# Patient Record
Sex: MALE | Race: WHITE | NOT HISPANIC OR LATINO | URBAN - METROPOLITAN AREA
[De-identification: names, ages, dates, MRNs, and addresses within clinical notes are randomized per-mention and may not be internally consistent; named-entity substitution may affect disease eponyms.]

---

## 2020-03-23 ENCOUNTER — EMERGENCY (EMERGENCY)
Facility: HOSPITAL | Age: 55
LOS: 0 days | Discharge: HOME | End: 2020-03-23
Attending: EMERGENCY MEDICINE | Admitting: INTERNAL MEDICINE
Payer: SUBSIDIZED

## 2020-03-23 VITALS
HEART RATE: 68 BPM | OXYGEN SATURATION: 98 % | RESPIRATION RATE: 20 BRPM | SYSTOLIC BLOOD PRESSURE: 178 MMHG | TEMPERATURE: 99 F | WEIGHT: 191.14 LBS | DIASTOLIC BLOOD PRESSURE: 96 MMHG

## 2020-03-23 VITALS
OXYGEN SATURATION: 97 % | SYSTOLIC BLOOD PRESSURE: 146 MMHG | DIASTOLIC BLOOD PRESSURE: 83 MMHG | TEMPERATURE: 98 F | RESPIRATION RATE: 20 BRPM | HEART RATE: 78 BPM

## 2020-03-23 DIAGNOSIS — F17.210 NICOTINE DEPENDENCE, CIGARETTES, UNCOMPLICATED: ICD-10-CM

## 2020-03-23 DIAGNOSIS — R47.1 DYSARTHRIA AND ANARTHRIA: ICD-10-CM

## 2020-03-23 DIAGNOSIS — I63.9 CEREBRAL INFARCTION, UNSPECIFIED: ICD-10-CM

## 2020-03-23 LAB
ALBUMIN SERPL ELPH-MCNC: 4.7 G/DL — SIGNIFICANT CHANGE UP (ref 3.5–5.2)
ALP SERPL-CCNC: 120 U/L — HIGH (ref 30–115)
ALT FLD-CCNC: 20 U/L — SIGNIFICANT CHANGE UP (ref 0–41)
ANION GAP SERPL CALC-SCNC: 13 MMOL/L — SIGNIFICANT CHANGE UP (ref 7–14)
APTT BLD: 25.3 SEC — LOW (ref 27–39.2)
AST SERPL-CCNC: 26 U/L — SIGNIFICANT CHANGE UP (ref 0–41)
BASOPHILS # BLD AUTO: 0.05 K/UL — SIGNIFICANT CHANGE UP (ref 0–0.2)
BASOPHILS NFR BLD AUTO: 0.5 % — SIGNIFICANT CHANGE UP (ref 0–1)
BILIRUB SERPL-MCNC: <0.2 MG/DL — SIGNIFICANT CHANGE UP (ref 0.2–1.2)
BUN SERPL-MCNC: 11 MG/DL — SIGNIFICANT CHANGE UP (ref 10–20)
CALCIUM SERPL-MCNC: 9.6 MG/DL — SIGNIFICANT CHANGE UP (ref 8.5–10.1)
CHLORIDE SERPL-SCNC: 103 MMOL/L — SIGNIFICANT CHANGE UP (ref 98–110)
CO2 SERPL-SCNC: 23 MMOL/L — SIGNIFICANT CHANGE UP (ref 17–32)
CREAT SERPL-MCNC: 1.2 MG/DL — SIGNIFICANT CHANGE UP (ref 0.7–1.5)
EOSINOPHIL # BLD AUTO: 0.21 K/UL — SIGNIFICANT CHANGE UP (ref 0–0.7)
EOSINOPHIL NFR BLD AUTO: 2.2 % — SIGNIFICANT CHANGE UP (ref 0–8)
GLUCOSE SERPL-MCNC: 92 MG/DL — SIGNIFICANT CHANGE UP (ref 70–99)
HCT VFR BLD CALC: 42.4 % — SIGNIFICANT CHANGE UP (ref 42–52)
HGB BLD-MCNC: 14.3 G/DL — SIGNIFICANT CHANGE UP (ref 14–18)
IMM GRANULOCYTES NFR BLD AUTO: 0.4 % — HIGH (ref 0.1–0.3)
INR BLD: 0.97 RATIO — SIGNIFICANT CHANGE UP (ref 0.65–1.3)
LYMPHOCYTES # BLD AUTO: 3 K/UL — SIGNIFICANT CHANGE UP (ref 1.2–3.4)
LYMPHOCYTES # BLD AUTO: 31 % — SIGNIFICANT CHANGE UP (ref 20.5–51.1)
MCHC RBC-ENTMCNC: 31 PG — SIGNIFICANT CHANGE UP (ref 27–31)
MCHC RBC-ENTMCNC: 33.7 G/DL — SIGNIFICANT CHANGE UP (ref 32–37)
MCV RBC AUTO: 92 FL — SIGNIFICANT CHANGE UP (ref 80–94)
MONOCYTES # BLD AUTO: 0.53 K/UL — SIGNIFICANT CHANGE UP (ref 0.1–0.6)
MONOCYTES NFR BLD AUTO: 5.5 % — SIGNIFICANT CHANGE UP (ref 1.7–9.3)
NEUTROPHILS # BLD AUTO: 5.84 K/UL — SIGNIFICANT CHANGE UP (ref 1.4–6.5)
NEUTROPHILS NFR BLD AUTO: 60.4 % — SIGNIFICANT CHANGE UP (ref 42.2–75.2)
NRBC # BLD: 0 /100 WBCS — SIGNIFICANT CHANGE UP (ref 0–0)
PLATELET # BLD AUTO: 259 K/UL — SIGNIFICANT CHANGE UP (ref 130–400)
POTASSIUM SERPL-MCNC: 4.9 MMOL/L — SIGNIFICANT CHANGE UP (ref 3.5–5)
POTASSIUM SERPL-SCNC: 4.9 MMOL/L — SIGNIFICANT CHANGE UP (ref 3.5–5)
PROT SERPL-MCNC: 8.3 G/DL — HIGH (ref 6–8)
PROTHROM AB SERPL-ACNC: 11.2 SEC — SIGNIFICANT CHANGE UP (ref 9.95–12.87)
RBC # BLD: 4.61 M/UL — LOW (ref 4.7–6.1)
RBC # FLD: 12.4 % — SIGNIFICANT CHANGE UP (ref 11.5–14.5)
SODIUM SERPL-SCNC: 139 MMOL/L — SIGNIFICANT CHANGE UP (ref 135–146)
WBC # BLD: 9.67 K/UL — SIGNIFICANT CHANGE UP (ref 4.8–10.8)
WBC # FLD AUTO: 9.67 K/UL — SIGNIFICANT CHANGE UP (ref 4.8–10.8)

## 2020-03-23 PROCEDURE — 70496 CT ANGIOGRAPHY HEAD: CPT | Mod: 26

## 2020-03-23 PROCEDURE — 70498 CT ANGIOGRAPHY NECK: CPT | Mod: 26

## 2020-03-23 PROCEDURE — 99204 OFFICE O/P NEW MOD 45 MIN: CPT | Mod: GC

## 2020-03-23 PROCEDURE — 99285 EMERGENCY DEPT VISIT HI MDM: CPT

## 2020-03-23 PROCEDURE — 70450 CT HEAD/BRAIN W/O DYE: CPT | Mod: 26,59

## 2020-03-23 PROCEDURE — 93010 ELECTROCARDIOGRAM REPORT: CPT

## 2020-03-23 RX ORDER — ATORVASTATIN CALCIUM 80 MG/1
80 TABLET, FILM COATED ORAL ONCE
Refills: 0 | Status: COMPLETED | OUTPATIENT
Start: 2020-03-23 | End: 2020-03-23

## 2020-03-23 RX ORDER — ASPIRIN/CALCIUM CARB/MAGNESIUM 324 MG
81 TABLET ORAL DAILY
Refills: 0 | Status: DISCONTINUED | OUTPATIENT
Start: 2020-03-23 | End: 2020-03-23

## 2020-03-23 RX ORDER — ENOXAPARIN SODIUM 100 MG/ML
40 INJECTION SUBCUTANEOUS DAILY
Refills: 0 | Status: DISCONTINUED | OUTPATIENT
Start: 2020-03-23 | End: 2020-03-23

## 2020-03-23 RX ORDER — ATORVASTATIN CALCIUM 80 MG/1
1 TABLET, FILM COATED ORAL
Qty: 30 | Refills: 0
Start: 2020-03-23 | End: 2020-04-21

## 2020-03-23 RX ORDER — ASPIRIN/CALCIUM CARB/MAGNESIUM 324 MG
1 TABLET ORAL
Qty: 30 | Refills: 0
Start: 2020-03-23 | End: 2020-04-21

## 2020-03-23 RX ORDER — ATORVASTATIN CALCIUM 80 MG/1
80 TABLET, FILM COATED ORAL AT BEDTIME
Refills: 0 | Status: DISCONTINUED | OUTPATIENT
Start: 2020-03-24 | End: 2020-03-23

## 2020-03-23 RX ORDER — CHLORHEXIDINE GLUCONATE 213 G/1000ML
1 SOLUTION TOPICAL
Refills: 0 | Status: DISCONTINUED | OUTPATIENT
Start: 2020-03-23 | End: 2020-03-23

## 2020-03-23 RX ADMIN — Medication 81 MILLIGRAM(S): at 15:54

## 2020-03-23 RX ADMIN — ATORVASTATIN CALCIUM 80 MILLIGRAM(S): 80 TABLET, FILM COATED ORAL at 16:00

## 2020-03-23 NOTE — H&P ADULT - NSHPPHYSICALEXAM_GEN_ALL_CORE
GENERAL: NAD, well-developed  HEENT:  Atraumatic, Normocephalic; EOMI, PERRLA, conjunctiva pink, sclera white, Supple, No JVD, thyromegally or LYAD appreciated  CHEST/LUNG: Clear to auscultation bilaterally; No wheeze, ronchi or rales  HEART: Regular rate and rhythm; No murmurs, rubs, or gallops  ABDOMEN: Soft, Nontender, Nondistended; Bowel sounds present  EXTREMITIES:  2+ Peripheral Pulses, No peripheral pitting edema  PSYCH: AAOx3  NEUROLOGY: non-focal  SKIN: No rashes/lesions appreciated PATIENT HAS CHOSEN TO LEAVE AMA. WENT TO EXAMINE AND ADMIT PT AND AT THAT TIME REFUSED BOTH SAYING 'I AM NOT STAYING I WANT TO GO HOME'.     I EXPLAINED AT LENGTH THAT WORKUP WAS NOT COMPLETE AND THAT HE DOESN'T HAVE A PRIMARY CARE DOCTOR TO SEE BUT HE WAS ADAMANT. HE IS AAOX3 AND ENDORSES UNDERSTANDING OF ALL RISKS INCLUDING DEATH. PT IS NOT DISCHARGED - WORKUP INCOMPLETE, DISCHARGE AGAINST MEDICAL ADVICE.     (Discussed risk of worsening of CVA, hemorrhagic conversion, risk of paralysis/vegetative state, and/or death if goes home).

## 2020-03-23 NOTE — DISCHARGE NOTE PROVIDER - NSDCFUADDINST_GEN_ALL_CORE_FT
YOU HAVE CHOSEN TO LEAVE AMA. MEDICAL PROVIDERS EXPLAINED AT LENGTH THAT WORKUP IS NOT COMPLETE.   You do not HAVE A PRIMARY CARE DOCTOR to follow up with   You verbally endorse UNDERSTANDING OF ALL RISKS INCLUDING DEATH. YOU ARE NOT DISCHARGED  as medical workup incomplete including futher blood work, echocardiogram, and MRI head. You had elevated blood pressure and need to be screened for underlying cardiac disease and diabetes.   SIGNING OUT AGAINST MEDICAL ADVICE.   Discussed risk of worsening of CVA, hemorrhagic conversion, risk of paralysis/vegetative state, and/or death if goes home.

## 2020-03-23 NOTE — DISCHARGE NOTE PROVIDER - NSFOLLOWUPCLINICS_GEN_ALL_ED_FT
[facility]  Neurology  [address]  [place] [address]  Phone: [phone]  Fax:   Follow Up Time:     [facility]  Medicine  [address] [place], [place]   Phone: [phone]  Fax:   Follow Up Time:

## 2020-03-23 NOTE — ED PROVIDER NOTE - CLINICAL SUMMARY MEDICAL DECISION MAKING FREE TEXT BOX
54 Y/O M + SMOKER C/O DYSARTHRIA UPON AWAKENING THIS AM. STRONE CODE ACTIVATED. NIHSS-3 AS PER NEURO EVAL. CT HEAD WITH NO ACUTE PATHOLOGY. ALL DIAGNOSTIC TESTING REVIEWED. PT ADMITTED TO STROKE UNIT.

## 2020-03-23 NOTE — DISCHARGE NOTE NURSING/CASE MANAGEMENT/SOCIAL WORK - NSDCPEPTSTRK_GEN_ALL_CORE
Risk factors for stroke/Stroke warning signs and symptoms/Signs and symptoms of stroke/Call 911 for stroke/Stroke education booklet/Prescribed medications/Stroke support groups for patients, families, and friends/Need for follow up after discharge

## 2020-03-23 NOTE — ED PROVIDER NOTE - PHYSICAL EXAMINATION
CONSTITUTIONAL: Well-developed; well-nourished; in no acute distress, speaking in full sentences  SKIN: warm, dry  HEAD: Normocephalic; atraumatic  EYES: PERRL, EOMI, no conjunctival erythema  ENT: No nasal discharge; airway clear, mucous membranes moist  NECK: Supple; non tender, FROM  CARD: +S1, S2 no murmurs, gallops, or rubs. Regular rate and rhythm. radial 2+  RESP: No wheezes, rales or rhonchi. CTABL  ABD: soft ntnd, no rebound, no guarding, no rigidity,   EXT: moves all extremities, ambulates wo assistance No clubbing, cyanosis or edema.   NEURO: Alert, oriented, +right sided facial droop with dysarthria noted, no pronator drift, no dysmetria, ue and le strength and sensation grossly intact  PSYCH: Cooperative, appropriate

## 2020-03-23 NOTE — DISCHARGE NOTE PROVIDER - CARE PROVIDER_API CALL
Anand De La Garza (DO)  Medicine  Physicians  [address] [place] [address]  Phone: [phone]  Fax: [phone]  Follow Up Time: 1 week

## 2020-03-23 NOTE — H&P ADULT - HISTORY OF PRESENT ILLNESS
54y/o M with active smoker, no known PMH but doesn't see Dr presents to hospital with dysarthria. Woke up 6AM with some difficulty with speech, thought it would improve so didn't seek medical attention. Last normal night before. By later in morning wife also noticed L facial droop so he presented to ED. Stroke code called at 2PM but pt was not candidate for tPA given timeframe.    In ED: T98.9, HR 78, RR20, SaO2 98% on ambient air, 146/83. Labs only remarkable for slight elevated in ALP. CTH and CTA done showed no acute pathology.   Seen by neuro with starting NIHSS 3 (dysathria, L lower face droop), MRANKIN 0. Received ASA 81 and atorvastatin 80. 56y/o M with active smoker, no known PMH but doesn't see Dr presents to hospital with dysarthria. Woke up 6AM with some difficulty with speech, thought it would improve so didn't seek medical attention. Last normal night before. By later in morning wife also noticed L facial droop so he presented to ED. Stroke code called at 2PM but pt was not candidate for tPA given timeframe.    PATIENT HAS CHOSEN TO LEAVE AMA. WENT TO EXAMINE AND ADMIT PT AND AT THAT TIME REFUSED BOTH SAYING 'I AM NOT STAYING I WANT TO GO HOME'.     I EXPLAINED AT LENGTH THAT WORKUP WAS NOT COMPLETE AND THAT HE DOESN'T HAVE A PRIMARY CARE DOCTOR TO SEE BUT HE WAS ADAMANT. HE IS AAOX3 AND ENDORSES UNDERSTANDING OF ALL RISKS INCLUDING DEATH. PT IS NOT DISCHARGED - WORKUP INCOMPLETE, DISCHARGE AGAINST MEDICAL ADVICE.     (Discussed risk of worsening of CVA, hemorrhagic conversion, risk of paralysis/vegetative state, and/or death if goes home).

## 2020-03-23 NOTE — ED PROVIDER NOTE - ATTENDING CONTRIBUTION TO CARE
I personally evaluated the patient. I reviewed the Resident’s or Physician Assistant’s note (as assigned above), and agree with the findings and plan except as documented in my note.   54 Y/O M 1/2 PPD SMOKER, C/O DYSARTHRIA UPON AWAKENING THIS AM. PT REPORTS WIFE ALSO NOTED FACIAL DROOP LATER IN THE MORNING. NO VISION CHANGES. NO PARESTHESIAS OR MOTOR WEAKNESS. NO ATAXIA, DIZZINESS. NO HEADACHE. NO CP, SOB, PALPITATIONS. NO RECENT ILLNESS, FEVER, CHILLS, COUGH. VITALS NOTED. . ALERT OX3 NAD. NCAT PERRL EOMI. CN 2-12 INTACT NORMAL SPEECH EXCEPT+ MILD L LOWER FACIAL DROOP. 5/5 MOTOR STRENGTH B/L UPPER AND B/L LOWER EXT. NO PRONATOR DRIFT. NORMAL FINGER TO NOSE B/L. NO SENSORY DEFICIT. RRR. LUNGS CLEAR B/L. ABD- SOFT NONTENDER. BACK NONTENDER. 2+ RADIAL AND PEDAL PULSES B/L.

## 2020-03-23 NOTE — ED ADULT NURSE REASSESSMENT NOTE - NS ED NURSE REASSESS COMMENT FT1
pt states he wants to leave AMA. despite education/encouragement by RN.  MAR x [phone] made aware. awaiting for resident to see pt at bedside.report given to stroke unit but pt states he does not want to go. charge nurse made aware.

## 2020-03-23 NOTE — H&P ADULT - ATTENDING COMMENTS
Went to see the patient and he is uncooperative and wants to go home AMA after being admitted to the hospital for sudden dysarthria and left facial droop. Pt wants to go home. He does not have PMD and wants to now f/u with one.    Dx: TIA vs Acute Stroke unfolding    Plan:  AMA d/c  ASA 81mg po qd  Atorvastatin 40mg po qd  Low Salt Diet  Low Carb Diet  Weight Loss long term   f/u with [facility] and with Neurology clinic for TTE and full stroke labs w/up   Hydrate well   Stay at home and avoid infection    Vital Signs Last 24 Hrs  T(C): 36.4 (23 Mar 2020 15:08), Max: 37.2 (23 Mar 2020 13:55)  T(F): 97.6 (23 Mar 2020 15:08), Max: 98.9 (23 Mar 2020 13:55)  HR: 78 (23 Mar 2020 15:08) (68 - 81)  BP: 146/83 (23 Mar 2020 15:08) (146/83 - 178/96)  RR: 20 (23 Mar 2020 15:08) (18 - 20)  SpO2: 97% (23 Mar 2020 15:08) (97% - 98%)    CV: NL S1, S2  Resp: CTA bilateral   GI; WNL  Ext: WNL  Dysathria / fascial droop (initial now resolved)                          14.3   9.67  )-----------( 259      ( 23 Mar 2020 14:15 )             42.4       03-23    139  |  103  |  11  ----------------------------<  92  4.9   |  23  |  1.2    Ca    9.6      23 Mar 2020 14:15    TPro  8.3<H>  /  Alb  4.7  /  TBili  <0.2  /  DBili  x   /  AST  26  /  ALT  20  /  AlkPhos  120<H>  03-23      CTA  IMPRESSION:   No evidence of major vascular stenosis or occlusion.    CTH  IMPRESSION:   No CT evidence of acute intracranial hemorrhage or large territory infarct.    Otherwise agree with above resident documentation

## 2020-03-23 NOTE — H&P ADULT - ASSESSMENT
56y/o M who smokes but no known other medical hx, presents with persistent slurring speech & face droop.     #Dysarthria/face droop; likely CVA  - initial NIH 3  - CTH neg, CT perfusion neg  - stroke unit for now, neuro check q4h  - check lipid profile, A1c  - hypercoagulable labs  - MRI brain w/o MICHELLE  - ECHO with bubble  - S&S and OT eval   - keep -180  - Neuro recs appreciated  - repeat CT if any change in NIH/symptoms     #Active smoker- strongly urged cessation, c/w education  #Poor HCM- will need [facility] appt once d/c    #MISC  - DVT ppx: lovenox  - GI ppx: not indicated  - Activity: as tolerated  - Diet: NPO until S&S eval  - Dispo: stroke unit 54y/o M who smokes but no known other medical hx, presents with persistent slurring speech & face droop.     PATIENT HAS CHOSEN TO LEAVE AMA. WENT TO EXAMINE AND ADMIT PT AND AT THAT TIME REFUSED BOTH SAYING 'I AM NOT STAYING I WANT TO GO HOME'.     I EXPLAINED AT LENGTH THAT WORKUP WAS NOT COMPLETE AND THAT HE DOESN'T HAVE A PRIMARY CARE DOCTOR TO SEE BUT HE WAS ADAMANT. HE IS AAOX3 AND ENDORSES UNDERSTANDING OF ALL RISKS INCLUDING DEATH. PT IS NOT DISCHARGED - WORKUP INCOMPLETE, DISCHARGE AGAINST MEDICAL ADVICE.     (Discussed risk of worsening of CVA, hemorrhagic conversion, risk of paralysis/vegetative state, and/or death if goes home).

## 2020-03-23 NOTE — DISCHARGE NOTE NURSING/CASE MANAGEMENT/SOCIAL WORK - PATIENT PORTAL LINK FT
You can access the FollowMyHealth Patient Portal offered by [facility] by registering at the following website: [email or web address]. By joining [facility]’s FollowMyHealth portal, you will also be able to view your health information using other applications (apps) compatible with our system.

## 2020-03-23 NOTE — DISCHARGE NOTE PROVIDER - HOSPITAL COURSE
54y/o M with active smoker, no known PMH but doesn't see Dr presents to hospital with dysarthria. Woke up 6AM with some difficulty with speech, thought it would improve so didn't seek medical attention. Last normal night before. By later in morning wife also noticed L facial droop so he presented to ED. Stroke code called at 2PM but pt was not candidate for tPA given timeframe.        In ED: T98.9, HR 78, RR20, SaO2 98% on ambient air, 146/83. Labs only remarkable for slight elevated in ALP. CTH and CTA done showed no acute pathology.     Seen by neuro with starting NIHSS 3 (dysathria, L lower face droop), MRANKIN 0. Received ASA 81 and atorvastatin 80.         PATIENT HAS CHOSEN TO LEAVE AMA. WENT TO EXAMINE AND ADMIT PT AND AT THAT TIME REFUSED BOTH SAYING 'I AM NOT STAYING I WANT TO GO HOME'.         I EXPLAINED AT LENGTH THAT WORKUP WAS NOT COMPLETE AND THAT HE DOESN'T HAVE A PRIMARY CARE DOCTOR TO SEE BUT HE WAS ADAMANT. HE IS AAOX3 AND ENDORSES UNDERSTANDING OF ALL RISKS INCLUDING DEATH. PT IS NOT DISCHARGED - WORKUP INCOMPLETE, DISCHARGE AGAINST MEDICAL ADVICE.         (Discussed risk of worsening of CVA, hemorrhagic conversion, risk of paralysis/vegetative state, and/or death if goes home).

## 2020-03-23 NOTE — ED PROVIDER NOTE - OBJECTIVE STATEMENT
56yo m no sig pmhx presents CC dysarthria since waking up this morning, wife noted facial droop. no vision or hearing changes, no weakness or numbness. stroke code called upon arrival to [facility] ed.

## 2020-03-23 NOTE — CHART NOTE - NSCHARTNOTEFT_GEN_A_CORE
Caled for stroke code at 2:06pm for right facial and slurred speech which was noticed at 6AM this morning.  NIHSS given at 3 (facial and dysarthria) no other focal symptoms. mrankin 0 at baseline  Patient took 2 baby aspirin this morning.    Plan  1. CTH and CTA H+N  2. Give total 325mg Aspirin if CTH negative for hemorrhage  3. Given atorvastatin 80mg QD first dose now  4. Send lipid profile, hgba1c  5. MRI brain w/o MICHELLE  6. ECHO with bubble  7. Send hypercoagulable labs  8. Stroke unit 24 hours for q4 hour neurochecks and vitals  9. Dysphagia screening prior to PO meds  10. PT/OT eval   11. -180  12. Call for any changes in NIHSS or medical exam

## 2020-03-23 NOTE — H&P ADULT - NSHPLABSRESULTS_GEN_ALL_CORE
< from: CT Brain Stroke Protocol (03.23.20 @ 14:32) >    IMPRESSION:     No CT evidence of acute intracranial hemorrhage or large territory infarct.    < end of copied text >

## 2020-03-23 NOTE — ED ADULT NURSE NOTE - OBJECTIVE STATEMENT
pt presents for slurred speech since 6 am this morning. stroke code initiated 1406. pt denies pain. pt is able to follow commands. NIH score-3  for right sided facial droop, slurred speech. pt is alert and oriented. additional neuro exam intact. denies headache. denies vision changes. strength equal bilaterally. denies cp/sob. pt states he took 2 baby aspirin prior to arrival. pt presents for slurred speech since 6 am this morning. stroke code initiated 1406. pt denies pain. pt is able to follow commands. NIH score-3  for left sided facial droop, slurred speech. pt is alert and oriented. additional neuro exam intact. denies headache. denies vision changes. strength equal bilaterally. denies cp/sob. pt states he took 2 baby aspirin prior to arrival. pt presents for slurred speech since 6 am this morning. stroke code initiated 1406. pt denies pain. pt is able to follow commands. NIH score-2 for left sided facial droop, slurred speech. pt is alert and oriented. additional neuro exam intact. denies headache. denies vision changes. strength equal bilaterally. denies cp/sob. pt states he took 2 baby aspirin prior to arrival.

## 2020-03-23 NOTE — ED PROVIDER NOTE - CONSULTANT FREE TEXT FOR MDM DISCUSSED CASE WITH QUESTION
bilateral upper extremity Active ROM was WFL (within functional limits)/bilateral  lower extremity Active ROM was WFL (within functional limits) NEUROLOGY

## 2024-07-24 NOTE — DISCHARGE NOTE PROVIDER - NSDCCPCAREPLAN_GEN_ALL_CORE_FT
How Severe Is Your Skin Lesion?: moderate Is This A New Presentation, Or A Follow-Up?: Skin Lesion Which Family Member (Optional)?: Sister PRINCIPAL DISCHARGE DIAGNOSIS  Diagnosis: Stroke  Assessment and Plan of Treatment: You presented with possible stroke.   -YOU HAVE CHOSEN TO LEAVE AMA. MEDICAL PROVIDERS EXPLAINED AT LENGTH THAT WORKUP IS NOT COMPLETE.   -You do not HAVE A PRIMARY CARE DOCTOR to follow up with   You verbally endorse UNDERSTANDING OF ALL RISKS INCLUDING DEATH. YOU ARE NOT DISCHARGED  as medical workup incomplete including futher blood work, echocardiogram, and MRI head. You had elevated blood pressure and need to be screened for underlying cardiac disease and diabetes.   -SIGNING OUT AGAINST MEDICAL ADVICE.   -Discussed risk of worsening of CVA, hemorrhagic conversion, risk of paralysis/vegetative state, and/or death if goes home.  -In the interim please take aspirin 81 and atorvastatin 80mg daily.  -Please monitor you status carefully - if you have any worsening symptoms such as weakness, numbness/tingling, difficulty swallowing or breathing, chest pain, palpitations, headaches, worsening motor skills, inability to ambulate, change in your level/ability to think OR ANY OTHER SIMILAR SYMPTOMS please return to the Emergency department or seek medical attention immediately. Do not drive yourself, call 911.

## 2025-09-04 ENCOUNTER — EMERGENCY (EMERGENCY)
Facility: HOSPITAL | Age: 60
LOS: 0 days | Discharge: HOME | End: 2025-09-04
Attending: STUDENT IN AN ORGANIZED HEALTH CARE EDUCATION/TRAINING PROGRAM
Payer: SELF-PAY

## 2025-09-04 VITALS
OXYGEN SATURATION: 96 % | WEIGHT: 190.04 LBS | RESPIRATION RATE: 21 BRPM | SYSTOLIC BLOOD PRESSURE: 155 MMHG | DIASTOLIC BLOOD PRESSURE: 98 MMHG | HEART RATE: 61 BPM | TEMPERATURE: 98 F | HEIGHT: 72 IN

## 2025-09-04 DIAGNOSIS — R07.89 OTHER CHEST PAIN: ICD-10-CM

## 2025-09-04 DIAGNOSIS — R94.31 ABNORMAL ELECTROCARDIOGRAM [ECG] [EKG]: ICD-10-CM

## 2025-09-04 DIAGNOSIS — Z53.29 PROCEDURE AND TREATMENT NOT CARRIED OUT BECAUSE OF PATIENT'S DECISION FOR OTHER REASONS: ICD-10-CM

## 2025-09-04 DIAGNOSIS — F17.200 NICOTINE DEPENDENCE, UNSPECIFIED, UNCOMPLICATED: ICD-10-CM

## 2025-09-04 PROBLEM — Z78.9 OTHER SPECIFIED HEALTH STATUS: Chronic | Status: ACTIVE | Noted: 2020-03-23

## 2025-09-04 PROCEDURE — 99284 EMERGENCY DEPT VISIT MOD MDM: CPT | Mod: 25

## 2025-09-04 PROCEDURE — 71045 X-RAY EXAM CHEST 1 VIEW: CPT

## 2025-09-04 PROCEDURE — 93005 ELECTROCARDIOGRAM TRACING: CPT

## 2025-09-04 PROCEDURE — 93010 ELECTROCARDIOGRAM REPORT: CPT

## 2025-09-04 PROCEDURE — 99284 EMERGENCY DEPT VISIT MOD MDM: CPT

## 2025-09-04 PROCEDURE — 71045 X-RAY EXAM CHEST 1 VIEW: CPT | Mod: 26
